# Patient Record
Sex: FEMALE | Race: WHITE | NOT HISPANIC OR LATINO | Employment: UNEMPLOYED | URBAN - METROPOLITAN AREA
[De-identification: names, ages, dates, MRNs, and addresses within clinical notes are randomized per-mention and may not be internally consistent; named-entity substitution may affect disease eponyms.]

---

## 2017-01-31 ENCOUNTER — OFFICE VISIT (OUTPATIENT)
Dept: CARDIOLOGY | Facility: MEDICAL CENTER | Age: 62
End: 2017-01-31
Payer: COMMERCIAL

## 2017-01-31 ENCOUNTER — NON-PROVIDER VISIT (OUTPATIENT)
Dept: CARDIOLOGY | Facility: MEDICAL CENTER | Age: 62
End: 2017-01-31
Payer: COMMERCIAL

## 2017-01-31 VITALS
WEIGHT: 168 LBS | HEART RATE: 88 BPM | BODY MASS INDEX: 27.99 KG/M2 | HEIGHT: 65 IN | SYSTOLIC BLOOD PRESSURE: 162 MMHG | DIASTOLIC BLOOD PRESSURE: 80 MMHG

## 2017-01-31 DIAGNOSIS — Z95.0 CARDIAC PACEMAKER IN SITU: ICD-10-CM

## 2017-01-31 DIAGNOSIS — I10 ESSENTIAL HYPERTENSION: ICD-10-CM

## 2017-01-31 DIAGNOSIS — I49.5 SICK SINUS SYNDROME (HCC): ICD-10-CM

## 2017-01-31 PROCEDURE — 93280 PM DEVICE PROGR EVAL DUAL: CPT | Performed by: INTERNAL MEDICINE

## 2017-01-31 PROCEDURE — 99214 OFFICE O/P EST MOD 30 MIN: CPT | Performed by: INTERNAL MEDICINE

## 2017-01-31 ASSESSMENT — ENCOUNTER SYMPTOMS
CLAUDICATION: 0
CHILLS: 0
HEADACHES: 0
NAUSEA: 0
PALPITATIONS: 0
BLURRED VISION: 0
DIZZINESS: 0
LOSS OF CONSCIOUSNESS: 0
SORE THROAT: 0
WEAKNESS: 0
FOCAL WEAKNESS: 0
ABDOMINAL PAIN: 0
PND: 0
COUGH: 0
FALLS: 0
BRUISES/BLEEDS EASILY: 0
FEVER: 0
SHORTNESS OF BREATH: 0

## 2017-01-31 NOTE — Clinical Note
[facility]   [address], [address]  [place] [address]  Phone: [phone]  Fax: [phone]              Brooklynn Jain  1955    Encounter Date: 1/31/2017    Jairo Castillo M.D.          PROGRESS NOTE:  Subjective:   Brooklynn Jain is a 61 y.o. female who presents today for both hypertension dyslipidemia in the setting of diabetes with pacemaker with paroxysmal atrial fibrillation    She's been doing well since our last visit she follows up regularly with her primary care on her labs    Past Medical History   Diagnosis Date   • Unspecified sleep apnea    • Signs and symptoms involving emotional state    • Conduction disorder, unspecified CARDIAC CONDUCTION SYSTEM DISEASE, STATUS POST DUAL CHAMBER PACEMAKER.   • Type II or unspecified type diabetes mellitus without mention of complication, not stated as uncontrolled    • Other chronic nonalcoholic liver disease    • Other and unspecified hyperlipidemia    • Unspecified essential hypertension    • Atrial fibrillation (CMS-HCC)    • Cardiac pacemaker in situ 5/30/2012 6/7/2006: Dual chamber Medtronic impulse DR model, #[id number], serial #[id number] implanted by Dr. Jaydon Wyatt.      Past Surgical History   Procedure Laterality Date   • Cholecystectomy     • Tonsillectomy and adenoidectomy     • Appendectomy       History reviewed. No pertinent family history.  History   Smoking status   • Never Smoker    Smokeless tobacco   • Never Used     No Known Allergies  Outpatient Encounter Prescriptions as of 1/31/2017   Medication Sig Dispense Refill   • labetalol (NORMODYNE) 100 MG TABS TAKE ONE TABLET ORALLY ONCE DAILY 90 Tab 2   • losartan-hydrochlorothiazide (HYZAAR) 100-12.5 MG per tablet Take 1 Tab by mouth every day.     • simvastatin (ZOCOR) 40 MG Tab Take 40 mg by mouth every evening.     • Multiple Vitamins-Minerals (MULTI FOR HER PO) Take  by mouth.     • B COMPLEX-C PO Take  by mouth.     • metformin  "(GLUCOPHAGE) 500 MG TABS Take 500 mg by mouth 2 times a day.     • alprazolam (XANAX) 0.25 MG TABS Take 1 Tab by mouth 3 times a day as needed for Sleep. f 90 Tab 0   • aspirin buffered (ASCRIPTIN) 325 MG TABS Take 1 Tab by mouth every day.       No facility-administered encounter medications on file as of 1/31/2017.     Review of Systems   Constitutional: Negative for fever and chills.   HENT: Negative for sore throat.    Eyes: Negative for blurred vision.   Respiratory: Negative for cough and shortness of breath.    Cardiovascular: Negative for chest pain, palpitations, claudication, leg swelling and PND.   Gastrointestinal: Negative for nausea and abdominal pain.   Musculoskeletal: Negative for falls.   Skin: Negative for rash.   Neurological: Negative for dizziness, focal weakness, loss of consciousness, weakness and headaches.   Endo/Heme/Allergies: Does not bruise/bleed easily.        Objective:   /80 mmHg  Pulse 88  Ht 1.651 m (5' 5\")  Wt 76.204 kg (168 lb)  BMI 27.96 kg/m2    Physical Exam   Constitutional: No distress.   HENT:   Mouth/Throat: Oropharynx is clear and moist.   Eyes: No scleral icterus.   Cardiovascular: Normal rate, regular rhythm and intact distal pulses.  Exam reveals no gallop and no friction rub.    No murmur heard.  Pulmonary/Chest: Effort normal and breath sounds normal. She has no rales.   Abdominal: Bowel sounds are normal. There is no tenderness.   Musculoskeletal: She exhibits no edema.   Neurological: She is alert.   Skin: No rash noted. She is not diaphoretic.   Psychiatric: She has a normal mood and affect.     We reviewed her labs which show well-controlled dyslipidemia A1c was 7    Her EKG from primary care show sinus rhythm with IVCD  Assessment:     1. Essential hypertension     2. Cardiac pacemaker in situ     3. Sick sinus syndrome (CMS-HCC)         Medical Decision Making:  Today's Assessment / Status / Plan:     It was my pleasure to meet with Ms. " Susy.    Doing well for her cardiac status she is on appropriate medications for blood pressure and lipids, blood pressure was a bit higher today she reports her home blood pressures are adequate.    I will see Ms. Jain back in 6 months time and encouraged her to follow up with us over the phone or e-mail using my MyChart as issues arise.    It is my pleasure to participate in the care of Ms. Jain.  Please do not hesitate to contact me with questions or concerns.    Jairo Castillo MD PhD FAC  Cardiologist [facility]        Robb Veliz M.D.  [address] [address]  VIA Facsimile: [phone]

## 2017-01-31 NOTE — MR AVS SNAPSHOT
"        Brooklynn Jain   2017 4:15 PM   Office Visit   MRN: [id number]    Department:  [facility]   Dept Phone:  [phone]    Description:  Female : 1955   Provider:  Jairo Castillo M.D.           Reason for Visit     Follow-Up           Allergies as of 2017     No Known Allergies      You were diagnosed with     Essential hypertension   [2227908]       Cardiac pacemaker in situ   [V45.01.ICD-9-CM]       Sick sinus syndrome (CMS-HCC)   [024486]         Vital Signs     Blood Pressure Pulse Height Weight Body Mass Index Smoking Status    162/80 mmHg 88 1.651 m (5' 5\") 76.204 kg (168 lb) 27.96 kg/m2 Never Smoker       Basic Information     Date Of Birth Sex Race Ethnicity Preferred Language    1955 Female White Non- English      Your appointments     2017  4:00 PM   PACER CHECK ONLY with PACER CHECK-[facility]   [facility] (--)    [address], [address]  [place] [id number] [phone]            2017  4:15 PM   FOLLOW UP with Jairo Castillo M.D.   [facility] (--)    [address], [address]  [place] [id number] [phone]              Problem List              ICD-10-CM Priority Class Noted - Resolved    Sleep apnea G47.30   Unknown - Present    Signs and symptoms involving emotional state R45.89   Unknown - Present    Cardiac conduction disorder I45.9   Unknown - Present    Type II or unspecified type diabetes mellitus without mention of complication, not stated as uncontrolled E11.9   Unknown - Present    Other chronic nonalcoholic liver disease K76.89   Unknown - Present    Other and unspecified hyperlipidemia E78.5   Unknown - Present    Essential hypertension I10   Unknown - Present    Paroxysmal atrial fibrillation (CMS-HCC) I48.0   Unknown - Present    Cardiac pacemaker in situ Z95.0   2012 - Present    Sick sinus syndrome (CMS-HCC) I49.5   2014 - Present  "      Health Maintenance        Date Due Completion Dates    IMM HEP B VACCINE (1 of 3 - Primary Series) 1955 ---    A1C SCREENING 1955 ---    DIABETES MONOFILAMENT / LE EXAM 1955 ---    RETINAL SCREENING 3/5/1975 ---    URINE ACR / MICROALBUMIN 3/5/1975 ---    IMM DTaP/Tdap/Td Vaccine (1 - Tdap) 3/5/1976 ---    IMM PNEUMOCOCCAL 19-64 (ADULT) MEDIUM RISK SERIES (1 of 1 - PPSV23) 3/5/1976 ---    PAP SMEAR 3/5/1978 ---    MAMMOGRAM 3/5/1997 ---    COLONOSCOPY 3/5/2007 ---    FASTING LIPID PROFILE 6/11/2013 6/11/2012    SERUM CREATININE 6/11/2013 6/11/2012    IMM INFLUENZA (1) 9/1/2016 ---            Current Immunizations     No immunizations on file.      Below and/or attached are the medications your provider expects you to take. Review all of your home medications and newly ordered medications with your provider and/or pharmacist. Follow medication instructions as directed by your provider and/or pharmacist. Please keep your medication list with you and share with your provider. Update the information when medications are discontinued, doses are changed, or new medications (including over-the-counter products) are added; and carry medication information at all times in the event of emergency situations     Allergies:  No Known Allergies          Medications  Valid as of: January 31, 2017 -  4:57 PM    Generic Name Brand Name Tablet Size Instructions for use    ALPRAZolam (Tab) XANAX 0.25 MG Take 1 Tab by mouth 3 times a day as needed for Sleep. f        Aspirin Buf(IhItu-AwAho-NyBrj) (Tab) ASCRIPTIN 325 MG Take 1 Tab by mouth every day.        B Complex-C   Take  by mouth.        Labetalol HCl (Tab) NORMODYNE 100 MG TAKE ONE TABLET ORALLY ONCE DAILY        Losartan Potassium-HCTZ (Tab) HYZAAR 100-12.5 MG Take 1 Tab by mouth every day.        MetFORMIN HCl (Tab) GLUCOPHAGE 500 MG Take 500 mg by mouth 2 times a day.        Multiple Vitamins-Minerals   Take  by mouth.        Simvastatin (Tab) ZOCOR 40 MG  Take 40 mg by mouth every evening.        .                 Medicines prescribed today were sent to:     [facility] [place], [place] - [address] [place] [address]    Phone: [phone] Fax: [phone]    Open 24 Hours?: No      Medication refill instructions:       If your prescription bottle indicates you have medication refills left, it is not necessary to call your provider’s office. Please contact your pharmacy and they will refill your medication.    If your prescription bottle indicates you do not have any refills left, you may request refills at any time through one of the following ways: The online Letsdecco system (except Urgent Care), by calling your provider’s office, or by asking your pharmacy to contact your provider’s office with a refill request. Medication refills are processed only during regular business hours and may not be available until the next business day. Your provider may request additional information or to have a follow-up visit with you prior to refilling your medication.   *Please Note: Medication refills are assigned a new Rx number when refilled electronically. Your pharmacy may indicate that no refills were authorized even though a new prescription for the same medication is available at the pharmacy. Please request the medicine by name with the pharmacy before contacting your provider for a refill.           Letsdecco Access Code: [email or web address]  Expires: 3/2/2017  4:57 PM    Letsdecco  A secure, online tool to manage your health information     [employer]’s Letsdecco® is a secure, online tool that connects you to your personalized health information from the privacy of your home -- day or night - making it very easy for you to manage your healthcare. Once the activation process is completed, you can even access your medical information using the Letsdecco abdias, which is available for free in the Apple Abdias store or Google Play  store.     THERAVECTYS provides the following levels of access (as shown below):   My Chart Features   [facility] Primary [facility] Doctor [facility]  Specialists [facility]  Urgent  Care Non-[facility]  Primary Care  Doctor   Email your healthcare team securely and privately 24/7 X X X    Manage appointments: schedule your next appointment; view details of past/upcoming appointments X      Request prescription refills. X      View recent personal medical records, including lab and immunizations X X X X   View health record, including health history, allergies, medications X X X X   Read reports about your outpatient visits, procedures, consult and ER notes X X X X   See your discharge summary, which is a recap of your hospital and/or ER visit that includes your diagnosis, lab results, and care plan. X X       How to register for THERAVECTYS:  1. Go to  [email or web address].  2. Click on the Sign Up Now box, which takes you to the New Member Sign Up page. You will need to provide the following information:  a. Enter your THERAVECTYS Access Code exactly as it appears at the top of this page. (You will not need to use this code after you’ve completed the sign-up process. If you do not sign up before the expiration date, you must request a new code.)   b. Enter your date of birth.   c. Enter your home email address.   d. Click Submit, and follow the next screen’s instructions.  3. Create a THERAVECTYS ID. This will be your THERAVECTYS login ID and cannot be changed, so think of one that is secure and easy to remember.  4. Create a THERAVECTYS password. You can change your password at any time.  5. Enter your Password Reset Question and Answer. This can be used at a later time if you forget your password.   6. Enter your e-mail address. This allows you to receive e-mail notifications when new information is available in THERAVECTYS.  7. Click Sign Up. You can now view your health information.    For assistance activating your THERAVECTYS account, call [phone]

## 2017-02-01 NOTE — PROGRESS NOTES
Subjective:   Brooklynn Jain is a 61 y.o. female who presents today for both hypertension dyslipidemia in the setting of diabetes with pacemaker with paroxysmal atrial fibrillation    She's been doing well since our last visit she follows up regularly with her primary care on her labs    Past Medical History   Diagnosis Date   • Unspecified sleep apnea    • Signs and symptoms involving emotional state    • Conduction disorder, unspecified CARDIAC CONDUCTION SYSTEM DISEASE, STATUS POST DUAL CHAMBER PACEMAKER.   • Type II or unspecified type diabetes mellitus without mention of complication, not stated as uncontrolled    • Other chronic nonalcoholic liver disease    • Other and unspecified hyperlipidemia    • Unspecified essential hypertension    • Atrial fibrillation (CMS-HCC)    • Cardiac pacemaker in situ 5/30/2012 6/7/2006: Dual chamber Medtronic impulse DR model, #[id number], serial #[id number] implanted by Dr. Jaydon Wyatt.      Past Surgical History   Procedure Laterality Date   • Cholecystectomy     • Tonsillectomy and adenoidectomy     • Appendectomy       History reviewed. No pertinent family history.  History   Smoking status   • Never Smoker    Smokeless tobacco   • Never Used     No Known Allergies  Outpatient Encounter Prescriptions as of 1/31/2017   Medication Sig Dispense Refill   • labetalol (NORMODYNE) 100 MG TABS TAKE ONE TABLET ORALLY ONCE DAILY 90 Tab 2   • losartan-hydrochlorothiazide (HYZAAR) 100-12.5 MG per tablet Take 1 Tab by mouth every day.     • simvastatin (ZOCOR) 40 MG Tab Take 40 mg by mouth every evening.     • Multiple Vitamins-Minerals (MULTI FOR HER PO) Take  by mouth.     • B COMPLEX-C PO Take  by mouth.     • metformin (GLUCOPHAGE) 500 MG TABS Take 500 mg by mouth 2 times a day.     • alprazolam (XANAX) 0.25 MG TABS Take 1 Tab by mouth 3 times a day as needed for Sleep. f 90 Tab 0   • aspirin buffered (ASCRIPTIN) 325 MG TABS Take 1 Tab by mouth every day.       No  "facility-administered encounter medications on file as of 1/31/2017.     Review of Systems   Constitutional: Negative for fever and chills.   HENT: Negative for sore throat.    Eyes: Negative for blurred vision.   Respiratory: Negative for cough and shortness of breath.    Cardiovascular: Negative for chest pain, palpitations, claudication, leg swelling and PND.   Gastrointestinal: Negative for nausea and abdominal pain.   Musculoskeletal: Negative for falls.   Skin: Negative for rash.   Neurological: Negative for dizziness, focal weakness, loss of consciousness, weakness and headaches.   Endo/Heme/Allergies: Does not bruise/bleed easily.        Objective:   /80 mmHg  Pulse 88  Ht 1.651 m (5' 5\")  Wt 76.204 kg (168 lb)  BMI 27.96 kg/m2    Physical Exam   Constitutional: No distress.   HENT:   Mouth/Throat: Oropharynx is clear and moist.   Eyes: No scleral icterus.   Cardiovascular: Normal rate, regular rhythm and intact distal pulses.  Exam reveals no gallop and no friction rub.    No murmur heard.  Pulmonary/Chest: Effort normal and breath sounds normal. She has no rales.   Abdominal: Bowel sounds are normal. There is no tenderness.   Musculoskeletal: She exhibits no edema.   Neurological: She is alert.   Skin: No rash noted. She is not diaphoretic.   Psychiatric: She has a normal mood and affect.     We reviewed her labs which show well-controlled dyslipidemia A1c was 7    Her EKG from primary care show sinus rhythm with IVCD  Assessment:     1. Essential hypertension     2. Cardiac pacemaker in situ     3. Sick sinus syndrome (CMS-HCC)         Medical Decision Making:  Today's Assessment / Status / Plan:     It was my pleasure to meet with Ms. Jain.    Doing well for her cardiac status she is on appropriate medications for blood pressure and lipids, blood pressure was a bit higher today she reports her home blood pressures are adequate.    I will see Ms. Jain back in 6 months time and encouraged her " to follow up with us over the phone or e-mail using my MyChart as issues arise.    It is my pleasure to participate in the care of Ms. Jain.  Please do not hesitate to contact me with questions or concerns.    Jairo Castillo MD PhD FACC  Cardiologist [facility]

## 2017-06-19 ENCOUNTER — NON-PROVIDER VISIT (OUTPATIENT)
Dept: CARDIOLOGY | Facility: MEDICAL CENTER | Age: 62
End: 2017-06-19
Payer: COMMERCIAL

## 2017-06-19 ENCOUNTER — OFFICE VISIT (OUTPATIENT)
Dept: CARDIOLOGY | Facility: MEDICAL CENTER | Age: 62
End: 2017-06-19
Payer: COMMERCIAL

## 2017-06-19 VITALS
HEIGHT: 65 IN | HEART RATE: 60 BPM | WEIGHT: 167 LBS | SYSTOLIC BLOOD PRESSURE: 120 MMHG | OXYGEN SATURATION: 97 % | DIASTOLIC BLOOD PRESSURE: 80 MMHG | BODY MASS INDEX: 27.82 KG/M2

## 2017-06-19 DIAGNOSIS — Z95.0 CARDIAC PACEMAKER IN SITU: ICD-10-CM

## 2017-06-19 DIAGNOSIS — I45.9 CARDIAC CONDUCTION DISORDER: ICD-10-CM

## 2017-06-19 DIAGNOSIS — I10 ESSENTIAL HYPERTENSION: ICD-10-CM

## 2017-06-19 DIAGNOSIS — I48.0 PAROXYSMAL ATRIAL FIBRILLATION (HCC): ICD-10-CM

## 2017-06-19 PROCEDURE — 99214 OFFICE O/P EST MOD 30 MIN: CPT | Mod: 25 | Performed by: INTERNAL MEDICINE

## 2017-06-19 PROCEDURE — 93280 PM DEVICE PROGR EVAL DUAL: CPT | Performed by: INTERNAL MEDICINE

## 2017-06-19 NOTE — Clinical Note
[facility]   [address]  [place] [address]  Phone: [phone]  Fax: [phone]              Brooklynn Jain  1955    Encounter Date: 6/19/2017    Jairo Castillo M.D.          PROGRESS NOTE:  Subjective:   Brooklynn Jain is a 62 y.o. female who presents today for follow-up of her history of pacemaker with paroxysmal atrial fibrillation    Her blood pressures were increasing and so her labetalol was increased by her primary care with good result    [place]    Past Medical History   Diagnosis Date   • Unspecified sleep apnea    • Signs and symptoms involving emotional state    • Conduction disorder, unspecified CARDIAC CONDUCTION SYSTEM DISEASE, STATUS POST DUAL CHAMBER PACEMAKER.   • Type II or unspecified type diabetes mellitus without mention of complication, not stated as uncontrolled    • Other chronic nonalcoholic liver disease    • Other and unspecified hyperlipidemia    • Unspecified essential hypertension    • Atrial fibrillation (CMS-HCC)    • Cardiac pacemaker in situ 5/30/2012 6/7/2006: Dual chamber Medtronic impulse DR model, #E2DR01, serial #[id number] implanted by Dr. Jaydon Wyatt.      Past Surgical History   Procedure Laterality Date   • Cholecystectomy     • Tonsillectomy and adenoidectomy     • Appendectomy       History reviewed. No pertinent family history.  History   Smoking status   • Never Smoker    Smokeless tobacco   • Never Used     No Known Allergies  Outpatient Encounter Prescriptions as of 6/19/2017   Medication Sig Dispense Refill   • losartan-hydrochlorothiazide (HYZAAR) 100-12.5 MG per tablet Take 1 Tab by mouth every day.     • simvastatin (ZOCOR) 40 MG Tab Take 40 mg by mouth every evening.     • Multiple Vitamins-Minerals (MULTI FOR HER PO) Take  by mouth.     • B COMPLEX-C PO Take  by mouth.     • labetalol (NORMODYNE) 100 MG TABS TAKE ONE TABLET  "ORALLY ONCE DAILY (Patient taking differently: TAKE ONE TABLET ORALLY 2x daily) 90 Tab 2   • metformin (GLUCOPHAGE) 500 MG TABS Take 500 mg by mouth 2 times a day.     • alprazolam (XANAX) 0.25 MG TABS Take 1 Tab by mouth 3 times a day as needed for Sleep. f 90 Tab 0   • aspirin buffered (ASCRIPTIN) 325 MG TABS Take 1 Tab by mouth every day.       No facility-administered encounter medications on file as of 6/19/2017.     Review of Systems   Constitutional: Negative for fever and chills.   HENT: Negative for sore throat.    Eyes: Negative for blurred vision.   Respiratory: Negative for cough and shortness of breath.    Cardiovascular: Negative for chest pain, palpitations, claudication, leg swelling and PND.   Gastrointestinal: Negative for nausea and abdominal pain.   Musculoskeletal: Negative for falls.   Skin: Negative for rash.   Neurological: Negative for dizziness, focal weakness, loss of consciousness, weakness and headaches.   Endo/Heme/Allergies: Does not bruise/bleed easily.        Objective:   /80 mmHg  Pulse 60  Ht 1.651 m (5' 5\")  Wt 75.751 kg (167 lb)  BMI 27.79 kg/m2  SpO2 97%    Physical Exam   Constitutional: No distress.   HENT:   Mouth/Throat: Oropharynx is clear and moist.   Eyes: No scleral icterus.   Cardiovascular: Normal rate, regular rhythm and intact distal pulses.  Exam reveals no gallop and no friction rub.    No murmur heard.  Pulmonary/Chest: Effort normal and breath sounds normal. She has no rales.   Abdominal: Bowel sounds are normal. There is no tenderness.   Musculoskeletal: She exhibits no edema.   Neurological: She is alert.   Skin: No rash noted. She is not diaphoretic.   Psychiatric: She has a normal mood and affect.       Assessment:     1. Cardiac pacemaker in situ     2. Paroxysmal atrial fibrillation (CMS-HCC)     3. Essential hypertension         Medical Decision Making:  Today's Assessment / Status / Plan:     It was my pleasure to meet with Ms. Jain.    Francescor " check today still reveals paroxysmal atrial fibrillation very short duration she understands based on her risk factors should this increase she may need long-term anticoagulation    Her blood pressure is now well controlled she'll continue to monitor this    I will see Ms. Jain back in 1 year time and encouraged her to follow up with us over the phone or e-mail using my MyChart as issues arise.    It is my pleasure to participate in the care of Ms. Jain.  Please do not hesitate to contact me with questions or concerns.    Jairo Castillo MD PhD [facility]  Cardiologist [facility]        Robb Veliz M.D.  [address] [address]  VIA Facsimile: [phone]

## 2017-06-19 NOTE — MR AVS SNAPSHOT
"        Brooklynn Jain   2017 1:15 PM   Office Visit   MRN: [id number]    Department:  [facility]   Dept Phone:  [phone]    Description:  Female : 1955   Provider:  Jairo Castillo M.D.           Reason for Visit     Follow-Up           Allergies as of 2017     No Known Allergies      You were diagnosed with     Cardiac pacemaker in situ   [V45.01.ICD-9-CM]       Paroxysmal atrial fibrillation (CMS-HCC)   [025303]       Essential hypertension   [0751737]         Vital Signs     Blood Pressure Pulse Height Weight Body Mass Index Oxygen Saturation    120/80 mmHg 60 1.651 m (5' 5\") 75.751 kg (167 lb) 27.79 kg/m2 97%    Smoking Status                   Never Smoker            Basic Information     Date Of Birth Sex Race Ethnicity Preferred Language    1955 Female White Non- English      Your appointments     2017  4:00 PM   PACER CHECK ONLY with PACER CHECK-[facility]   [facility] (--)    [address], [facility] [address]  [place] [address]   [phone]              Problem List              ICD-10-CM Priority Class Noted - Resolved    Sleep apnea G47.30   Unknown - Present    Signs and symptoms involving emotional state R45.89   Unknown - Present    Cardiac conduction disorder I45.9   Unknown - Present    Type II or unspecified type diabetes mellitus without mention of complication, not stated as uncontrolled E11.9   Unknown - Present    Other chronic nonalcoholic liver disease K76.89   Unknown - Present    Other and unspecified hyperlipidemia E78.5   Unknown - Present    Essential hypertension I10   Unknown - Present    Paroxysmal atrial fibrillation (CMS-HCC) I48.0   Unknown - Present    Cardiac pacemaker in situ Z95.0   2012 - Present    Sick sinus syndrome (CMS-HCC) I49.5   2014 - Present      Health Maintenance        Date Due Completion Dates    IMM HEP B VACCINE (1 of 3 - Primary Series) 1955 ---    A1C SCREENING " 1955 ---    DIABETES MONOFILAMENT / LE EXAM 1955 ---    RETINAL SCREENING 3/5/1975 ---    URINE ACR / MICROALBUMIN 3/5/1975 ---    IMM DTaP/Tdap/Td Vaccine (1 - Tdap) 3/5/1976 ---    IMM PNEUMOCOCCAL 19-64 (ADULT) MEDIUM RISK SERIES (1 of 1 - PPSV23) 3/5/1976 ---    PAP SMEAR 3/5/1978 ---    MAMMOGRAM 3/5/1997 ---    COLONOSCOPY 3/5/2007 ---    FASTING LIPID PROFILE 6/11/2013 6/11/2012    SERUM CREATININE 6/11/2013 6/11/2012    IMM ZOSTER VACCINE 3/5/2017 ---            Current Immunizations     No immunizations on file.      Below and/or attached are the medications your provider expects you to take. Review all of your home medications and newly ordered medications with your provider and/or pharmacist. Follow medication instructions as directed by your provider and/or pharmacist. Please keep your medication list with you and share with your provider. Update the information when medications are discontinued, doses are changed, or new medications (including over-the-counter products) are added; and carry medication information at all times in the event of emergency situations     Allergies:  No Known Allergies          Medications  Valid as of: June 19, 2017 -  1:45 PM    Generic Name Brand Name Tablet Size Instructions for use    ALPRAZolam (Tab) XANAX 0.25 MG Take 1 Tab by mouth 3 times a day as needed for Sleep. f        Aspirin Buf(SgSvy-QrJed-KcZbo) (Tab) ASCRIPTIN 325 MG Take 1 Tab by mouth every day.        B Complex-C   Take  by mouth.        Labetalol HCl (Tab) NORMODYNE 100 MG TAKE ONE TABLET ORALLY ONCE DAILY        Losartan Potassium-HCTZ (Tab) HYZAAR 100-12.5 MG Take 1 Tab by mouth every day.        MetFORMIN HCl (Tab) GLUCOPHAGE 500 MG Take 500 mg by mouth 2 times a day.        Multiple Vitamins-Minerals   Take  by mouth.        Simvastatin (Tab) ZOCOR 40 MG Take 40 mg by mouth every evening.        .                 Medicines prescribed today were sent to:     [id number] - [id number]  [facility] [address] [address]    Phone: [phone] Fax: [phone]    Open 24 Hours?: No      Medication refill instructions:       If your prescription bottle indicates you have medication refills left, it is not necessary to call your provider’s office. Please contact your pharmacy and they will refill your medication.    If your prescription bottle indicates you do not have any refills left, you may request refills at any time through one of the following ways: The online [employer] system (except Urgent Care), by calling your provider’s office, or by asking your pharmacy to contact your provider’s office with a refill request. Medication refills are processed only during regular business hours and may not be available until the next business day. Your provider may request additional information or to have a follow-up visit with you prior to refilling your medication.   *Please Note: Medication refills are assigned a new Rx number when refilled electronically. Your pharmacy may indicate that no refills were authorized even though a new prescription for the same medication is available at the pharmacy. Please request the medicine by name with the pharmacy before contacting your provider for a refill.           [employer] Access Code: [email or web address]  Expires: 7/19/2017 12:50 PM    [employer]  A secure, online tool to manage your health information     [employer]’s [employer]® is a secure, online tool that connects you to your personalized health information from the privacy of your home -- day or night - making it very easy for you to manage your healthcare. Once the activation process is completed, you can even access your medical information using the [employer] abdias, which is available for free in the Apple Abdias store or Google Play store.     [employer] provides the following levels of access (as shown below):   My Chart Features   [facility] Primary Care Doctor  [facility]  Specialists [facility]  Urgent  Care Non-[facility]  Primary Care  Doctor   Email your healthcare team securely and privately 24/7 X X X    Manage appointments: schedule your next appointment; view details of past/upcoming appointments X      Request prescription refills. X      View recent personal medical records, including lab and immunizations X X X X   View health record, including health history, allergies, medications X X X X   Read reports about your outpatient visits, procedures, consult and ER notes X X X X   See your discharge summary, which is a recap of your hospital and/or ER visit that includes your diagnosis, lab results, and care plan. X X       How to register for [employer]:  1. Go to  [email or web address].  2. Click on the Sign Up Now box, which takes you to the New Member Sign Up page. You will need to provide the following information:  a. Enter your [employer] Access Code exactly as it appears at the top of this page. (You will not need to use this code after you’ve completed the sign-up process. If you do not sign up before the expiration date, you must request a new code.)   b. Enter your date of birth.   c. Enter your home email address.   d. Click Submit, and follow the next screen’s instructions.  3. Create a [employer] ID. This will be your [employer] login ID and cannot be changed, so think of one that is secure and easy to remember.  4. Create a [employer] password. You can change your password at any time.  5. Enter your Password Reset Question and Answer. This can be used at a later time if you forget your password.   6. Enter your e-mail address. This allows you to receive e-mail notifications when new information is available in [employer].  7. Click Sign Up. You can now view your health information.    For assistance activating your [employer] account, call [phone]

## 2017-06-20 ASSESSMENT — ENCOUNTER SYMPTOMS
SORE THROAT: 0
CHILLS: 0
NAUSEA: 0
DIZZINESS: 0
PND: 0
PALPITATIONS: 0
FEVER: 0
LOSS OF CONSCIOUSNESS: 0
FALLS: 0
BLURRED VISION: 0
WEAKNESS: 0
ABDOMINAL PAIN: 0
FOCAL WEAKNESS: 0
HEADACHES: 0
COUGH: 0
CLAUDICATION: 0
SHORTNESS OF BREATH: 0
BRUISES/BLEEDS EASILY: 0

## 2017-06-21 NOTE — PROGRESS NOTES
Subjective:   Brooklynn Jain is a 62 y.o. female who presents today for follow-up of her history of pacemaker with paroxysmal atrial fibrillation    Her blood pressures were increasing and so her labetalol was increased by her primary care with good result    She's been quite active [place] [employer] [place]    Past Medical History   Diagnosis Date   • Unspecified sleep apnea    • Signs and symptoms involving emotional state    • Conduction disorder, unspecified CARDIAC CONDUCTION SYSTEM DISEASE, STATUS POST DUAL CHAMBER PACEMAKER.   • Type II or unspecified type diabetes mellitus without mention of complication, not stated as uncontrolled    • Other chronic nonalcoholic liver disease    • Other and unspecified hyperlipidemia    • Unspecified essential hypertension    • Atrial fibrillation (CMS-HCC)    • Cardiac pacemaker in situ 5/30/2012 6/7/2006: Dual chamber Medtronic impulse DR model, #E2DR01, serial #[id number] implanted by Dr. Jaydon Wyatt.      Past Surgical History   Procedure Laterality Date   • Cholecystectomy     • Tonsillectomy and adenoidectomy     • Appendectomy       History reviewed. No pertinent family history.  History   Smoking status   • Never Smoker    Smokeless tobacco   • Never Used     No Known Allergies  Outpatient Encounter Prescriptions as of 6/19/2017   Medication Sig Dispense Refill   • losartan-hydrochlorothiazide (HYZAAR) 100-12.5 MG per tablet Take 1 Tab by mouth every day.     • simvastatin (ZOCOR) 40 MG Tab Take 40 mg by mouth every evening.     • Multiple Vitamins-Minerals (MULTI FOR HER PO) Take  by mouth.     • B COMPLEX-C PO Take  by mouth.     • labetalol (NORMODYNE) 100 MG TABS TAKE ONE TABLET ORALLY ONCE DAILY (Patient taking differently: TAKE ONE TABLET ORALLY 2x daily) 90 Tab 2   • metformin (GLUCOPHAGE) 500 MG TABS Take 500 mg by mouth 2 times a day.     • alprazolam (XANAX) 0.25 MG TABS Take 1 Tab by mouth 3 times a day as needed for Sleep. f 90 Tab 0  "  • aspirin buffered (ASCRIPTIN) 325 MG TABS Take 1 Tab by mouth every day.       No facility-administered encounter medications on file as of 6/19/2017.     Review of Systems   Constitutional: Negative for fever and chills.   HENT: Negative for sore throat.    Eyes: Negative for blurred vision.   Respiratory: Negative for cough and shortness of breath.    Cardiovascular: Negative for chest pain, palpitations, claudication, leg swelling and PND.   Gastrointestinal: Negative for nausea and abdominal pain.   Musculoskeletal: Negative for falls.   Skin: Negative for rash.   Neurological: Negative for dizziness, focal weakness, loss of consciousness, weakness and headaches.   Endo/Heme/Allergies: Does not bruise/bleed easily.        Objective:   /80 mmHg  Pulse 60  Ht 1.651 m (5' 5\")  Wt 75.751 kg (167 lb)  BMI 27.79 kg/m2  SpO2 97%    Physical Exam   Constitutional: No distress.   HENT:   Mouth/Throat: Oropharynx is clear and moist.   Eyes: No scleral icterus.   Cardiovascular: Normal rate, regular rhythm and intact distal pulses.  Exam reveals no gallop and no friction rub.    No murmur heard.  Pulmonary/Chest: Effort normal and breath sounds normal. She has no rales.   Abdominal: Bowel sounds are normal. There is no tenderness.   Musculoskeletal: She exhibits no edema.   Neurological: She is alert.   Skin: No rash noted. She is not diaphoretic.   Psychiatric: She has a normal mood and affect.       Assessment:     1. Cardiac pacemaker in situ     2. Paroxysmal atrial fibrillation (CMS-HCC)     3. Essential hypertension         Medical Decision Making:  Today's Assessment / Status / Plan:     It was my pleasure to meet with Ms. Jain.    Pacer check today still reveals paroxysmal atrial fibrillation very short duration she understands based on her risk factors should this increase she may need long-term anticoagulation    Her blood pressure is now well controlled she'll continue to monitor this    I will " see Ms. Jain back in 1 year time and encouraged her to follow up with us over the phone or e-mail using my MyChart as issues arise.    It is my pleasure to participate in the care of Ms. Jain.  Please do not hesitate to contact me with questions or concerns.    Jairo Castillo MD PhD FAC  Cardiologist [facility]

## 2017-12-20 ENCOUNTER — NON-PROVIDER VISIT (OUTPATIENT)
Dept: CARDIOLOGY | Facility: MEDICAL CENTER | Age: 62
End: 2017-12-20
Payer: COMMERCIAL

## 2017-12-20 ENCOUNTER — OFFICE VISIT (OUTPATIENT)
Dept: CARDIOLOGY | Facility: MEDICAL CENTER | Age: 62
End: 2017-12-20
Payer: COMMERCIAL

## 2017-12-20 VITALS
OXYGEN SATURATION: 95 % | HEIGHT: 65 IN | BODY MASS INDEX: 28.16 KG/M2 | HEART RATE: 74 BPM | DIASTOLIC BLOOD PRESSURE: 80 MMHG | WEIGHT: 169 LBS | SYSTOLIC BLOOD PRESSURE: 140 MMHG

## 2017-12-20 DIAGNOSIS — I48.0 PAROXYSMAL ATRIAL FIBRILLATION (HCC): ICD-10-CM

## 2017-12-20 DIAGNOSIS — Z95.0 CARDIAC PACEMAKER IN SITU: ICD-10-CM

## 2017-12-20 DIAGNOSIS — I10 ESSENTIAL HYPERTENSION: ICD-10-CM

## 2017-12-20 DIAGNOSIS — I49.5 SICK SINUS SYNDROME (HCC): ICD-10-CM

## 2017-12-20 PROCEDURE — 99214 OFFICE O/P EST MOD 30 MIN: CPT | Performed by: INTERNAL MEDICINE

## 2017-12-20 PROCEDURE — 93280 PM DEVICE PROGR EVAL DUAL: CPT | Performed by: INTERNAL MEDICINE

## 2017-12-20 NOTE — LETTER
[facility]   [address] [facility]  [place] [address]  Phone: [phone]  Fax: [phone]              Brooklynn Jain  1955    Encounter Date: 12/20/2017    Jairo Castillo M.D.          PROGRESS NOTE:  Subjective:   Brooklynn Jain is a 62 y.o. female who presents today For follow-up of her history of hypertension paroxysmal atrial fibrillation very brief on pacer checks with a pacemaker     She follows very regular with her primary care she had an JACQUELYN with abnormally high readings suggesting calcified arteries are blood pressure was also elevated on most checks    She's been doing well she does have some stress related to her work she reports left shoulder arthritis    Past Medical History:   Diagnosis Date   • Atrial fibrillation (CMS-HCC)    • Cardiac pacemaker in situ 5/30/2012 6/7/2006: Dual chamber Medtronic impulse DR model, #E2DR01, serial #[id number] implanted by Dr. Jaydon Wyatt.    • Conduction disorder, unspecified CARDIAC CONDUCTION SYSTEM DISEASE, STATUS POST DUAL CHAMBER PACEMAKER.   • Other and unspecified hyperlipidemia    • Other chronic nonalcoholic liver disease    • Signs and symptoms involving emotional state    • Type II or unspecified type diabetes mellitus without mention of complication, not stated as uncontrolled    • Unspecified essential hypertension    • Unspecified sleep apnea      Past Surgical History:   Procedure Laterality Date   • APPENDECTOMY     • CHOLECYSTECTOMY     • TONSILLECTOMY AND ADENOIDECTOMY       No family history on file.  History   Smoking Status   • Never Smoker   Smokeless Tobacco   • Never Used     No Known Allergies  Outpatient Encounter Prescriptions as of 12/20/2017   Medication Sig Dispense Refill   • losartan-hydrochlorothiazide (HYZAAR) 100-12.5 MG per tablet Take 1 Tab by mouth every day.     • simvastatin (ZOCOR) 40 MG Tab Take 40 mg by mouth every evening.     • Multiple Vitamins-Minerals  "(MULTI FOR HER PO) Take  by mouth.     • B COMPLEX-C PO Take  by mouth.     • labetalol (NORMODYNE) 100 MG TABS TAKE ONE TABLET ORALLY ONCE DAILY (Patient taking differently: TAKE ONE TABLET ORALLY 2x daily) 90 Tab 2   • metformin (GLUCOPHAGE) 500 MG TABS Take 500 mg by mouth 2 times a day.     • alprazolam (XANAX) 0.25 MG TABS Take 1 Tab by mouth 3 times a day as needed for Sleep. f 90 Tab 0   • aspirin buffered (ASCRIPTIN) 325 MG TABS Take 1 Tab by mouth every day.       No facility-administered encounter medications on file as of 12/20/2017.      Review of Systems   Constitutional: Negative for chills and fever.   HENT: Negative for sore throat.    Eyes: Negative for blurred vision.   Respiratory: Negative for cough and shortness of breath.    Cardiovascular: Negative for chest pain, palpitations, claudication, leg swelling and PND.   Gastrointestinal: Negative for abdominal pain and nausea.   Musculoskeletal: Positive for joint pain. Negative for falls.   Skin: Negative for rash.   Neurological: Negative for dizziness, focal weakness and weakness.   Endo/Heme/Allergies: Does not bruise/bleed easily.        Objective:   /80   Pulse 74   Ht 1.651 m (5' 5\")   Wt 76.7 kg (169 lb)   SpO2 95%   BMI 28.12 kg/m²      Physical Exam   Constitutional: No distress.   HENT:   Mouth/Throat: Oropharynx is clear and moist.   Eyes: No scleral icterus.   Cardiovascular: Normal rate, regular rhythm and intact distal pulses.  Exam reveals no gallop and no friction rub.    No murmur heard.  Pulmonary/Chest: Effort normal and breath sounds normal. She has no rales.   Abdominal: Bowel sounds are normal. There is no tenderness.   Musculoskeletal: She exhibits no edema.   Neurological: She is alert.   Skin: No rash noted. She is not diaphoretic.   Psychiatric: She has a normal mood and affect.     Pacer check today finds his functioning appropriately very brief atrial fibrillation still    Assessment:     1. Essential " hypertension     2. Paroxysmal atrial fibrillation (CMS-HCC)     3. Cardiac pacemaker in situ         Medical Decision Making:  Today's Assessment / Status / Plan:     It was my pleasure to meet with Ms. Jain.    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 Jain back in 6 months time and encouraged her to follow up with us over the phone or e-mail using my MyChart as issues arise.    It is my pleasure to participate in the care of Ms. Jain.  Please do not hesitate to contact me with questions or concerns.    Jairo Castillo MD PhD [facility]  Cardiologist [facility]        Robb Veliz M.D.  [address] [address]  VIA Facsimile: [phone]

## 2017-12-21 ASSESSMENT — ENCOUNTER SYMPTOMS
DIZZINESS: 0
BRUISES/BLEEDS EASILY: 0
CHILLS: 0
COUGH: 0
BLURRED VISION: 0
WEAKNESS: 0
SORE THROAT: 0
ABDOMINAL PAIN: 0
CLAUDICATION: 0
FEVER: 0
PALPITATIONS: 0
NAUSEA: 0
FOCAL WEAKNESS: 0
FALLS: 0
PND: 0
SHORTNESS OF BREATH: 0

## 2017-12-22 NOTE — PROGRESS NOTES
Subjective:   Brooklynn Jain is a 62 y.o. female who presents today For follow-up of her history of hypertension paroxysmal atrial fibrillation very brief on pacer checks with a pacemaker     She follows very regular with her primary care she had an JACQUELYN with abnormally high readings suggesting calcified arteries are blood pressure was also elevated on most checks    She's been doing well she does have some stress related to her work she reports left shoulder arthritis    Past Medical History:   Diagnosis Date   • Atrial fibrillation (CMS-HCC)    • Cardiac pacemaker in situ 5/30/2012 6/7/2006: Dual chamber Medtronic impulse DR model, #E2DR01, serial #[id number] implanted by Dr. Jaydon Wyatt.    • Conduction disorder, unspecified CARDIAC CONDUCTION SYSTEM DISEASE, STATUS POST DUAL CHAMBER PACEMAKER.   • Other and unspecified hyperlipidemia    • Other chronic nonalcoholic liver disease    • Signs and symptoms involving emotional state    • Type II or unspecified type diabetes mellitus without mention of complication, not stated as uncontrolled    • Unspecified essential hypertension    • Unspecified sleep apnea      Past Surgical History:   Procedure Laterality Date   • APPENDECTOMY     • CHOLECYSTECTOMY     • TONSILLECTOMY AND ADENOIDECTOMY       No family history on file.  History   Smoking Status   • Never Smoker   Smokeless Tobacco   • Never Used     No Known Allergies  Outpatient Encounter Prescriptions as of 12/20/2017   Medication Sig Dispense Refill   • losartan-hydrochlorothiazide (HYZAAR) 100-12.5 MG per tablet Take 1 Tab by mouth every day.     • simvastatin (ZOCOR) 40 MG Tab Take 40 mg by mouth every evening.     • Multiple Vitamins-Minerals (MULTI FOR HER PO) Take  by mouth.     • B COMPLEX-C PO Take  by mouth.     • labetalol (NORMODYNE) 100 MG TABS TAKE ONE TABLET ORALLY ONCE DAILY (Patient taking differently: TAKE ONE TABLET ORALLY 2x daily) 90 Tab 2   • metformin (GLUCOPHAGE) 500 MG TABS Take 500 mg  "by mouth 2 times a day.     • alprazolam (XANAX) 0.25 MG TABS Take 1 Tab by mouth 3 times a day as needed for Sleep. f 90 Tab 0   • aspirin buffered (ASCRIPTIN) 325 MG TABS Take 1 Tab by mouth every day.       No facility-administered encounter medications on file as of 12/20/2017.      Review of Systems   Constitutional: Negative for chills and fever.   HENT: Negative for sore throat.    Eyes: Negative for blurred vision.   Respiratory: Negative for cough and shortness of breath.    Cardiovascular: Negative for chest pain, palpitations, claudication, leg swelling and PND.   Gastrointestinal: Negative for abdominal pain and nausea.   Musculoskeletal: Positive for joint pain. Negative for falls.   Skin: Negative for rash.   Neurological: Negative for dizziness, focal weakness and weakness.   Endo/Heme/Allergies: Does not bruise/bleed easily.        Objective:   /80   Pulse 74   Ht 1.651 m (5' 5\")   Wt 76.7 kg (169 lb)   SpO2 95%   BMI 28.12 kg/m²     Physical Exam   Constitutional: No distress.   HENT:   Mouth/Throat: Oropharynx is clear and moist.   Eyes: No scleral icterus.   Cardiovascular: Normal rate, regular rhythm and intact distal pulses.  Exam reveals no gallop and no friction rub.    No murmur heard.  Pulmonary/Chest: Effort normal and breath sounds normal. She has no rales.   Abdominal: Bowel sounds are normal. There is no tenderness.   Musculoskeletal: She exhibits no edema.   Neurological: She is alert.   Skin: No rash noted. She is not diaphoretic.   Psychiatric: She has a normal mood and affect.     Pacer check today finds his functioning appropriately very brief atrial fibrillation still    Assessment:     1. Essential hypertension     2. Paroxysmal atrial fibrillation (CMS-HCC)     3. Cardiac pacemaker in situ         Medical Decision Making:  Today's Assessment / Status / Plan:     It was my pleasure to meet with Ms. Jain.    For now can continue to monitor her burden of atrial " fibrillation on the pacer check she understands with her risk factors that would recommend chronic anticoagulation should she have any significant increased such as episodes lasting longer than an hour    She'll continue monitor blood pressure closely    She is unsure if she may retire from her work at [employer] due to the associated stresses and schedule    Lipids are well controlled and has regular close follow-up with primary care    I will see Ms. Jain back in 6 months time and encouraged her to follow up with us over the phone or e-mail using my MyChart as issues arise.    It is my pleasure to participate in the care of Ms. Jain.  Please do not hesitate to contact me with questions or concerns.    Jairo Castillo MD PhD FACC  Cardiologist [facility]

## 2018-06-08 ENCOUNTER — NON-PROVIDER VISIT (OUTPATIENT)
Dept: CARDIOLOGY | Facility: MEDICAL CENTER | Age: 63
End: 2018-06-08
Payer: COMMERCIAL

## 2018-06-08 ENCOUNTER — OFFICE VISIT (OUTPATIENT)
Dept: CARDIOLOGY | Facility: MEDICAL CENTER | Age: 63
End: 2018-06-08
Payer: COMMERCIAL

## 2018-06-08 VITALS
SYSTOLIC BLOOD PRESSURE: 150 MMHG | WEIGHT: 168 LBS | DIASTOLIC BLOOD PRESSURE: 80 MMHG | BODY MASS INDEX: 27.99 KG/M2 | HEART RATE: 88 BPM | HEIGHT: 65 IN | OXYGEN SATURATION: 94 %

## 2018-06-08 DIAGNOSIS — Z95.0 CARDIAC PACEMAKER IN SITU: ICD-10-CM

## 2018-06-08 DIAGNOSIS — I49.5 SICK SINUS SYNDROME (HCC): ICD-10-CM

## 2018-06-08 DIAGNOSIS — I48.0 PAROXYSMAL ATRIAL FIBRILLATION (HCC): ICD-10-CM

## 2018-06-08 PROCEDURE — 93280 PM DEVICE PROGR EVAL DUAL: CPT | Performed by: INTERNAL MEDICINE

## 2018-06-08 PROCEDURE — 99214 OFFICE O/P EST MOD 30 MIN: CPT | Performed by: INTERNAL MEDICINE

## 2018-06-08 ASSESSMENT — ENCOUNTER SYMPTOMS
FOCAL WEAKNESS: 0
WEAKNESS: 0
CLAUDICATION: 0
DIZZINESS: 0
CHILLS: 0
NAUSEA: 0
BRUISES/BLEEDS EASILY: 0
SORE THROAT: 0
COUGH: 0
ABDOMINAL PAIN: 0
FALLS: 0
FEVER: 0
PALPITATIONS: 0
SHORTNESS OF BREATH: 0
BLURRED VISION: 0
PND: 0

## 2018-06-09 NOTE — PROGRESS NOTES
Chief Complaint   Patient presents with   • HTN (Controlled)     follow up       Subjective:   Brooklynn Jain is a 63 y.o. female who presents today for follow-up of her history of pacemaker with paroxysmal atrial fibrillation on pacer checks    She is doing well no major issues in the past 6 months still has A. fib less than an hour on pacer checks    Past Medical History:   Diagnosis Date   • Atrial fibrillation (HCC)    • Cardiac pacemaker in situ 5/30/2012 6/7/2006: Dual chamber Medtronic impulse DR model, #E2DR01, serial #[id number] implanted by Dr. Jaydon Wyatt.    • Conduction disorder, unspecified CARDIAC CONDUCTION SYSTEM DISEASE, STATUS POST DUAL CHAMBER PACEMAKER.   • Other and unspecified hyperlipidemia    • Other chronic nonalcoholic liver disease    • Signs and symptoms involving emotional state    • Type II or unspecified type diabetes mellitus without mention of complication, not stated as uncontrolled    • Unspecified essential hypertension    • Unspecified sleep apnea      Past Surgical History:   Procedure Laterality Date   • APPENDECTOMY     • CHOLECYSTECTOMY     • TONSILLECTOMY AND ADENOIDECTOMY       No family history on file.  Social History     Social History   • Marital status:      Spouse name: N/A   • Number of children: N/A   • Years of education: N/A     Occupational History   • Not on file.     Social History Main Topics   • Smoking status: Never Smoker   • Smokeless tobacco: Never Used   • Alcohol use No   • Drug use: Unknown   • Sexual activity: Yes     Other Topics Concern   • Not on file     Social History Narrative   • No narrative on file     No Known Allergies  Outpatient Encounter Prescriptions as of 6/8/2018   Medication Sig Dispense Refill   • losartan-hydrochlorothiazide (HYZAAR) 100-12.5 MG per tablet Take 1 Tab by mouth every day.     • simvastatin (ZOCOR) 40 MG Tab Take 40 mg by mouth every evening.     • Multiple Vitamins-Minerals (MULTI FOR HER PO) Take  by  "mouth.     • B COMPLEX-C PO Take  by mouth.     • labetalol (NORMODYNE) 100 MG TABS TAKE ONE TABLET ORALLY ONCE DAILY (Patient taking differently: TAKE ONE TABLET ORALLY 2x daily) 90 Tab 2   • metformin (GLUCOPHAGE) 500 MG TABS Take 500 mg by mouth 2 times a day.     • alprazolam (XANAX) 0.25 MG TABS Take 1 Tab by mouth 3 times a day as needed for Sleep. f 90 Tab 0   • aspirin buffered (ASCRIPTIN) 325 MG TABS Take 1 Tab by mouth every day.       No facility-administered encounter medications on file as of 6/8/2018.      Review of Systems   Constitutional: Negative for chills and fever.   HENT: Negative for sore throat.    Eyes: Negative for blurred vision.   Respiratory: Negative for cough and shortness of breath.    Cardiovascular: Negative for chest pain, palpitations, claudication, leg swelling and PND.   Gastrointestinal: Negative for abdominal pain and nausea.   Musculoskeletal: Negative for falls and joint pain.   Skin: Negative for rash.   Neurological: Negative for dizziness, focal weakness and weakness.   Endo/Heme/Allergies: Does not bruise/bleed easily.        Objective:   /80   Pulse 88   Ht 1.651 m (5' 5\")   Wt 76.2 kg (168 lb)   SpO2 94%   BMI 27.96 kg/m²     Physical Exam   Constitutional: No distress.   HENT:   Mouth/Throat: Oropharynx is clear and moist. No oropharyngeal exudate.   Eyes: No scleral icterus.   Neck: No JVD present.   Cardiovascular: Normal rate and normal heart sounds.  Exam reveals no gallop and no friction rub.    No murmur heard.  Pulmonary/Chest: No respiratory distress. She has no wheezes. She has no rales.   Abdominal: Soft. Bowel sounds are normal.   Musculoskeletal: She exhibits no edema.   Neurological: She is alert.   Skin: No rash noted. She is not diaphoretic.   Psychiatric: She has a normal mood and affect.       Assessment:     1. Paroxysmal atrial fibrillation (HCC)     2. Cardiac pacemaker in situ         Medical Decision Making:  Today's Assessment / " Status / Plan:     It was my pleasure to meet with Ms. Jain.    She follows closely with primary care  Again she consider long-term anticoagulation  Which in any case would be recommended certainly at age 67    She will discuss with primary care as well    I will see Ms. Jain back in 6-12 months time and encouraged her to follow up with us over the phone or e-mail using my MyChart as issues arise.    It is my pleasure to participate in the care of Ms. Jain.  Please do not hesitate to contact me with questions or concerns.    Jairo Castillo MD PhD FACC  Cardiologist [facility]

## 2019-03-08 ENCOUNTER — OFFICE VISIT (OUTPATIENT)
Dept: CARDIOLOGY | Facility: MEDICAL CENTER | Age: 64
End: 2019-03-08
Payer: COMMERCIAL

## 2019-03-08 ENCOUNTER — NON-PROVIDER VISIT (OUTPATIENT)
Dept: CARDIOLOGY | Facility: MEDICAL CENTER | Age: 64
End: 2019-03-08
Payer: COMMERCIAL

## 2019-03-08 VITALS
HEART RATE: 64 BPM | OXYGEN SATURATION: 97 % | WEIGHT: 168 LBS | BODY MASS INDEX: 27.99 KG/M2 | SYSTOLIC BLOOD PRESSURE: 126 MMHG | DIASTOLIC BLOOD PRESSURE: 68 MMHG | HEIGHT: 65 IN

## 2019-03-08 DIAGNOSIS — I49.5 SICK SINUS SYNDROME (HCC): ICD-10-CM

## 2019-03-08 DIAGNOSIS — Z95.0 CARDIAC PACEMAKER IN SITU: ICD-10-CM

## 2019-03-08 DIAGNOSIS — I48.0 PAROXYSMAL ATRIAL FIBRILLATION (HCC): ICD-10-CM

## 2019-03-08 PROCEDURE — 99214 OFFICE O/P EST MOD 30 MIN: CPT | Performed by: INTERNAL MEDICINE

## 2019-03-08 PROCEDURE — 93280 PM DEVICE PROGR EVAL DUAL: CPT | Performed by: INTERNAL MEDICINE

## 2019-03-08 RX ORDER — IRBESARTAN AND HYDROCHLOROTHIAZIDE 300; 12.5 MG/1; MG/1
1 TABLET, FILM COATED ORAL DAILY
COMMUNITY
Start: 2019-02-25

## 2019-03-08 ASSESSMENT — ENCOUNTER SYMPTOMS
COUGH: 0
ABDOMINAL PAIN: 0
PND: 0
SHORTNESS OF BREATH: 0
FOCAL WEAKNESS: 0
CHILLS: 0
NAUSEA: 0
BRUISES/BLEEDS EASILY: 0
FEVER: 0
FALLS: 0
CLAUDICATION: 0
PALPITATIONS: 0
BLURRED VISION: 0
SORE THROAT: 0
DIZZINESS: 0
WEAKNESS: 0

## 2019-03-08 NOTE — PATIENT INSTRUCTIONS
We discussed that you should talk with primary care (or endocrinology) about oral medication: empagliflozin (JARDIANCE®  Preferred), dapagliflozin (FARXIGA®), or Canagliflozin (Invokana® [CONCERN FOR FOOT AMPUTATION]); there are cardiovascular benefits but there are also risks.  You may also want to consider liraglutide (Victoza®) which is an injection with cardiovascular benefits but also risks.

## 2019-03-08 NOTE — LETTER
[facility]   [address], [address]  [place] [address]  Phone: [phone]  Fax: [phone]              Brooklynn Jain  1955    Encounter Date: 3/8/2019    Jairo Castillo M.D.          PROGRESS NOTE:  Chief Complaint   Patient presents with   • Atrial Fibrillation       Subjective:   Brooklynn Jain is a 64 y.o. female who presents today for follow-up of her history of paroxysmal A. fib ablation with a pacemaker which was actually placed for sick sinus syndrome.  She also has diabetes hypertension    She is been doing well she has a lot of stress with the significant snowfall this winter at [facility] with some orthopedic pains    Pacer check still shows paroxysms of atrial fibrillation lasting up to 2 hours which is a little bit longer than previously 45 minutes most of short-lived seem to occur throughout the day    Her arb was switched because of potential recall    Past Medical History:   Diagnosis Date   • Atrial fibrillation (HCC)    • Cardiac pacemaker in situ 5/30/2012 6/7/2006: Dual chamber Medtronic impulse DR model, #E2DR01, serial #[id number] implanted by Dr. Jaydon Wyatt.    • Conduction disorder, unspecified CARDIAC CONDUCTION SYSTEM DISEASE, STATUS POST DUAL CHAMBER PACEMAKER.   • Other and unspecified hyperlipidemia    • Other chronic nonalcoholic liver disease    • Signs and symptoms involving emotional state    • Type II or unspecified type diabetes mellitus without mention of complication, not stated as uncontrolled    • Unspecified essential hypertension    • Unspecified sleep apnea      Past Surgical History:   Procedure Laterality Date   • APPENDECTOMY     • CHOLECYSTECTOMY     • TONSILLECTOMY AND ADENOIDECTOMY       Family History   Problem Relation Age of Onset   • Hypertension Father      Social History     Social History   • Marital status:      Spouse name: N/A   • Number of children: N/A   • Years of education: N/A          Occupational History   • Not on file.     Social History Main Topics   • Smoking status: Never Smoker   • Smokeless tobacco: Never Used   • Alcohol use No   • Drug use: No   • Sexual activity: Yes     Other Topics Concern   • Not on file     Social History Narrative   • No narrative on file     No Known Allergies  Outpatient Encounter Prescriptions as of 3/8/2019   Medication Sig Dispense Refill   • losartan-hydrochlorothiazide (HYZAAR) 100-12.5 MG per tablet Take 1 Tab by mouth every day.     • simvastatin (ZOCOR) 40 MG Tab Take 40 mg by mouth every evening.     • Multiple Vitamins-Minerals (MULTI FOR HER PO) Take  by mouth.     • B COMPLEX-C PO Take  by mouth.     • labetalol (NORMODYNE) 100 MG TABS TAKE ONE TABLET ORALLY ONCE DAILY (Patient taking differently: TAKE ONE TABLET ORALLY 2x daily) 90 Tab 2   • metformin (GLUCOPHAGE) 500 MG TABS Take 500 mg by mouth 2 times a day.     • alprazolam (XANAX) 0.25 MG TABS Take 1 Tab by mouth 3 times a day as needed for Sleep. f 90 Tab 0   • aspirin buffered (ASCRIPTIN) 325 MG TABS Take 1 Tab by mouth every day.     • Dulaglutide (TRULICITY) 1.5 MG/0.5ML Solution Pen-injector Trulicity 1.5 mg/0.5 mL subcutaneous pen injector   INJECT ONE SYRINGE SUBCUTANEOUSLY ONCE A WEEK     • irbesartan-hydrochlorothiazide (AVALIDE) 300-12.5 MG per tablet        No facility-administered encounter medications on file as of 3/8/2019.      Review of Systems   Constitutional: Negative for chills and fever.   HENT: Negative for sore throat.    Eyes: Negative for blurred vision.   Respiratory: Negative for cough and shortness of breath.    Cardiovascular: Negative for chest pain, palpitations, claudication, leg swelling and PND.   Gastrointestinal: Negative for abdominal pain and nausea.   Musculoskeletal: Positive for joint pain. Negative for falls.   Skin: Negative for rash.   Neurological: Negative for dizziness, focal weakness and weakness.   Endo/Heme/Allergies: Does not bruise/bleed  "easily.        Objective:   /68 (BP Location: Left arm, Patient Position: Sitting, BP Cuff Size: Adult)   Pulse 64   Ht 1.651 m (5' 5\")   Wt 76.2 kg (168 lb)   SpO2 97%   BMI 27.96 kg/m²      Physical Exam   Constitutional: No distress.   HENT:   Mouth/Throat: Oropharynx is clear and moist. No oropharyngeal exudate.   Eyes: No scleral icterus.   Neck: No JVD present.   Cardiovascular: Normal rate and normal heart sounds.  Exam reveals no gallop and no friction rub.    No murmur heard.  Pulmonary/Chest: No respiratory distress. She has no wheezes. She has no rales.   Abdominal: Soft. Bowel sounds are normal.   Musculoskeletal: She exhibits no edema.   Neurological: She is alert.   Skin: No rash noted. She is not diaphoretic.   Psychiatric: She has a normal mood and affect.     Labs reviewed from primary care good lipid control on high-dose statin    A1c was 7.9    Pacer check shows is functioning appropriately with 11 episodes of A. fib going back to last June      Assessment:     1. Paroxysmal atrial fibrillation (HCC)     2. Cardiac pacemaker in situ         Medical Decision Making:  Today's Assessment / Status / Plan:     It was my pleasure to meet with Ms. Jain.    Blood pressure is well controlled.      She is on appropriate statin.    We discussed that you should talk with primary care (or endocrinology) about oral medication: empagliflozin (JARDIANCE®  Preferred), dapagliflozin (FARXIGA®), or Canagliflozin (Invokana® [CONCERN FOR FOOT AMPUTATION]); there are cardiovascular benefits but there are also risks.  You may also want to consider liraglutide (Victoza®) which is an injection with cardiovascular benefits but also risks.    We monitor for more afib on pacer checks for now I think we can hold off anticoagulation but she is nearing the appropriate age to start certain if she had episodes over 5 hours and would recommend chronic a coagulation    It is my pleasure to participate in the care of " Ms. Gonzalezes.  Please do not hesitate to contact me with questions or concerns.    Jairo Castillo MD PhD [facility]  Cardiologist [facility]    Please note that this dictation was created using voice recognition software. I have worked with consultants from the vendor as well as technical experts from [facility] to optimize the interface. I have made every reasonable attempt to correct obvious errors, but I expect that there are errors of grammar and possibly content I did not discover before finalizing the note.         Robb Veliz M.D.  [address] [address]  VIA Facsimile: [phone]

## 2019-03-08 NOTE — PROGRESS NOTES
Chief Complaint   Patient presents with   • Atrial Fibrillation       Subjective:   Brooklynn Jain is a 64 y.o. female who presents today for follow-up of her history of paroxysmal A. fib ablation with a pacemaker which was actually placed for sick sinus syndrome.  She also has diabetes hypertension    She is been doing well she has a lot of stress with the significant snowfall this winter at [facility] with some orthopedic pains    Pacer check still shows paroxysms of atrial fibrillation lasting up to 2 hours which is a little bit longer than previously 45 minutes most of short-lived seem to occur throughout the day    Her arb was switched because of potential recall    Past Medical History:   Diagnosis Date   • Atrial fibrillation (HCC)    • Cardiac pacemaker in situ 5/30/2012 6/7/2006: Dual chamber Medtronic impulse DR model, #E2DR01, serial #[id number] implanted by Dr. Jaydon Wyatt.    • Conduction disorder, unspecified CARDIAC CONDUCTION SYSTEM DISEASE, STATUS POST DUAL CHAMBER PACEMAKER.   • Other and unspecified hyperlipidemia    • Other chronic nonalcoholic liver disease    • Signs and symptoms involving emotional state    • Type II or unspecified type diabetes mellitus without mention of complication, not stated as uncontrolled    • Unspecified essential hypertension    • Unspecified sleep apnea      Past Surgical History:   Procedure Laterality Date   • APPENDECTOMY     • CHOLECYSTECTOMY     • TONSILLECTOMY AND ADENOIDECTOMY       Family History   Problem Relation Age of Onset   • Hypertension Father      Social History     Social History   • Marital status:      Spouse name: N/A   • Number of children: N/A   • Years of education: N/A     Occupational History   • Not on file.     Social History Main Topics   • Smoking status: Never Smoker   • Smokeless tobacco: Never Used   • Alcohol use No   • Drug use: No   • Sexual activity: Yes     Other Topics Concern   • Not on file     Social History Narrative  "  • No narrative on file     No Known Allergies  Outpatient Encounter Prescriptions as of 3/8/2019   Medication Sig Dispense Refill   • losartan-hydrochlorothiazide (HYZAAR) 100-12.5 MG per tablet Take 1 Tab by mouth every day.     • simvastatin (ZOCOR) 40 MG Tab Take 40 mg by mouth every evening.     • Multiple Vitamins-Minerals (MULTI FOR HER PO) Take  by mouth.     • B COMPLEX-C PO Take  by mouth.     • labetalol (NORMODYNE) 100 MG TABS TAKE ONE TABLET ORALLY ONCE DAILY (Patient taking differently: TAKE ONE TABLET ORALLY 2x daily) 90 Tab 2   • metformin (GLUCOPHAGE) 500 MG TABS Take 500 mg by mouth 2 times a day.     • alprazolam (XANAX) 0.25 MG TABS Take 1 Tab by mouth 3 times a day as needed for Sleep. f 90 Tab 0   • aspirin buffered (ASCRIPTIN) 325 MG TABS Take 1 Tab by mouth every day.     • Dulaglutide (TRULICITY) 1.5 MG/0.5ML Solution Pen-injector Trulicity 1.5 mg/0.5 mL subcutaneous pen injector   INJECT ONE SYRINGE SUBCUTANEOUSLY ONCE A WEEK     • irbesartan-hydrochlorothiazide (AVALIDE) 300-12.5 MG per tablet        No facility-administered encounter medications on file as of 3/8/2019.      Review of Systems   Constitutional: Negative for chills and fever.   HENT: Negative for sore throat.    Eyes: Negative for blurred vision.   Respiratory: Negative for cough and shortness of breath.    Cardiovascular: Negative for chest pain, palpitations, claudication, leg swelling and PND.   Gastrointestinal: Negative for abdominal pain and nausea.   Musculoskeletal: Positive for joint pain. Negative for falls.   Skin: Negative for rash.   Neurological: Negative for dizziness, focal weakness and weakness.   Endo/Heme/Allergies: Does not bruise/bleed easily.        Objective:   /68 (BP Location: Left arm, Patient Position: Sitting, BP Cuff Size: Adult)   Pulse 64   Ht 1.651 m (5' 5\")   Wt 76.2 kg (168 lb)   SpO2 97%   BMI 27.96 kg/m²     Physical Exam   Constitutional: No distress.   HENT:   Mouth/Throat: " Oropharynx is clear and moist. No oropharyngeal exudate.   Eyes: No scleral icterus.   Neck: No JVD present.   Cardiovascular: Normal rate and normal heart sounds.  Exam reveals no gallop and no friction rub.    No murmur heard.  Pulmonary/Chest: No respiratory distress. She has no wheezes. She has no rales.   Abdominal: Soft. Bowel sounds are normal.   Musculoskeletal: She exhibits no edema.   Neurological: She is alert.   Skin: No rash noted. She is not diaphoretic.   Psychiatric: She has a normal mood and affect.     Labs reviewed from primary care good lipid control on high-dose statin    A1c was 7.9    Pacer check shows is functioning appropriately with 11 episodes of A. fib going back to last June      Assessment:     1. Paroxysmal atrial fibrillation (HCC)     2. Cardiac pacemaker in situ         Medical Decision Making:  Today's Assessment / Status / Plan:     It was my pleasure to meet with Ms. Jain.    Blood pressure is well controlled.      She is on appropriate statin.    We discussed that you should talk with primary care (or endocrinology) about oral medication: empagliflozin (JARDIANCE®  Preferred), dapagliflozin (FARXIGA®), or Canagliflozin (Invokana® [CONCERN FOR FOOT AMPUTATION]); there are cardiovascular benefits but there are also risks.  You may also want to consider liraglutide (Victoza®) which is an injection with cardiovascular benefits but also risks.    We monitor for more afib on pacer checks for now I think we can hold off anticoagulation but she is nearing the appropriate age to start certain if she had episodes over 5 hours and would recommend chronic a coagulation    It is my pleasure to participate in the care of Ms. Jain.  Please do not hesitate to contact me with questions or concerns.    Jairo Castillo MD PhD FACC  Cardiologist [facility]    Please note that this dictation was created using voice recognition software. I have worked with  consultants from the vendor as well as technical experts from [facility] to optimize the interface. I have made every reasonable attempt to correct obvious errors, but I expect that there are errors of grammar and possibly content I did not discover before finalizing the note.

## 2020-03-04 ENCOUNTER — OFFICE VISIT (OUTPATIENT)
Dept: CARDIOLOGY | Facility: MEDICAL CENTER | Age: 65
End: 2020-03-04
Payer: COMMERCIAL

## 2020-03-04 ENCOUNTER — NON-PROVIDER VISIT (OUTPATIENT)
Dept: CARDIOLOGY | Facility: MEDICAL CENTER | Age: 65
End: 2020-03-04
Payer: COMMERCIAL

## 2020-03-04 VITALS
SYSTOLIC BLOOD PRESSURE: 128 MMHG | BODY MASS INDEX: 27.82 KG/M2 | HEART RATE: 64 BPM | HEIGHT: 65 IN | DIASTOLIC BLOOD PRESSURE: 60 MMHG | RESPIRATION RATE: 16 BRPM | WEIGHT: 167 LBS | OXYGEN SATURATION: 97 %

## 2020-03-04 DIAGNOSIS — Z95.0 CARDIAC PACEMAKER IN SITU: ICD-10-CM

## 2020-03-04 DIAGNOSIS — I48.0 PAROXYSMAL ATRIAL FIBRILLATION (HCC): ICD-10-CM

## 2020-03-04 DIAGNOSIS — I49.5 SICK SINUS SYNDROME (HCC): ICD-10-CM

## 2020-03-04 PROCEDURE — 99214 OFFICE O/P EST MOD 30 MIN: CPT | Performed by: INTERNAL MEDICINE

## 2020-03-04 PROCEDURE — 93280 PM DEVICE PROGR EVAL DUAL: CPT | Performed by: INTERNAL MEDICINE

## 2020-03-05 ASSESSMENT — ENCOUNTER SYMPTOMS
PND: 0
NAUSEA: 0
BRUISES/BLEEDS EASILY: 0
SHORTNESS OF BREATH: 0
PALPITATIONS: 0
WEAKNESS: 0
ABDOMINAL PAIN: 0
FALLS: 0
CLAUDICATION: 0
DIZZINESS: 0
SORE THROAT: 0
FEVER: 0
BLURRED VISION: 0
FOCAL WEAKNESS: 0
CHILLS: 0
COUGH: 0

## 2020-03-05 NOTE — PROGRESS NOTES
Chief Complaint   Patient presents with   • Atrial Fibrillation       Subjective:   Brooklynn Jain is a 64 y.o. female who presents today for follow-up of her history of hypertension with a pacemaker sick sinus syndrome paroxysmal atrial fibrillation on pacer checks    She is been doing okay [place] [employer] [place]    She is working with her primary care on her diabetes treatment been doing okay    Past Medical History:   Diagnosis Date   • Atrial fibrillation (HCC)    • Cardiac pacemaker in situ 5/30/2012 6/7/2006: Dual chamber Medtronic impulse DR model, #E2DR01, serial #[id number] implanted by Dr. Jaydon Wyatt.    • Conduction disorder, unspecified CARDIAC CONDUCTION SYSTEM DISEASE, STATUS POST DUAL CHAMBER PACEMAKER.   • Other and unspecified hyperlipidemia    • Other chronic nonalcoholic liver disease    • Signs and symptoms involving emotional state    • Type II or unspecified type diabetes mellitus without mention of complication, not stated as uncontrolled    • Unspecified essential hypertension    • Unspecified sleep apnea      Past Surgical History:   Procedure Laterality Date   • APPENDECTOMY     • CHOLECYSTECTOMY     • TONSILLECTOMY AND ADENOIDECTOMY       Family History   Problem Relation Age of Onset   • Hypertension Father      Social History     Socioeconomic History   • Marital status:      Spouse name: Not on file   • Number of children: Not on file   • Years of education: Not on file   • Highest education level: Not on file   Occupational History   • Not on file   Social Needs   • Financial resource strain: Not on file   • Food insecurity     Worry: Not on file     Inability: Not on file   • Transportation needs     Medical: Not on file     Non-medical: Not on file   Tobacco Use   • Smoking status: Never Smoker   • Smokeless tobacco: Never Used   Substance and Sexual Activity   • Alcohol use: No   • Drug use: No   • Sexual activity: Yes   Lifestyle   • Physical  activity     Days per week: Not on file     Minutes per session: Not on file   • Stress: Not on file   Relationships   • Social connections     Talks on phone: Not on file     Gets together: Not on file     Attends Pentecostalism service: Not on file     Active member of club or organization: Not on file     Attends meetings of clubs or organizations: Not on file     Relationship status: Not on file   • Intimate partner violence     Fear of current or ex partner: Not on file     Emotionally abused: Not on file     Physically abused: Not on file     Forced sexual activity: Not on file   Other Topics Concern   • Not on file   Social History Narrative   • Not on file     Allergies   Allergen Reactions   • Liraglutide    • Triptans  [Sumatriptan]      Other reaction(s): Irregular heart rate     Outpatient Encounter Medications as of 3/4/2020   Medication Sig Dispense Refill   • Dulaglutide (TRULICITY) 1.5 MG/0.5ML Solution Pen-injector Trulicity 1.5 mg/0.5 mL subcutaneous pen injector   INJECT ONE SYRINGE SUBCUTANEOUSLY ONCE A WEEK     • irbesartan-hydrochlorothiazide (AVALIDE) 300-12.5 MG per tablet      • simvastatin (ZOCOR) 40 MG Tab Take 40 mg by mouth every evening.     • Multiple Vitamins-Minerals (MULTI FOR HER PO) Take  by mouth.     • B COMPLEX-C PO Take  by mouth.     • labetalol (NORMODYNE) 100 MG TABS TAKE ONE TABLET ORALLY ONCE DAILY (Patient taking differently: TAKE ONE TABLET ORALLY 2x daily) 90 Tab 2   • metformin (GLUCOPHAGE) 500 MG TABS Take 500 mg by mouth 2 times a day.     • alprazolam (XANAX) 0.25 MG TABS Take 1 Tab by mouth 3 times a day as needed for Sleep. f 90 Tab 0   • aspirin buffered (ASCRIPTIN) 325 MG TABS Take 1 Tab by mouth every day.       No facility-administered encounter medications on file as of 3/4/2020.      Review of Systems   Constitutional: Negative for chills and fever.   HENT: Negative for sore throat.    Eyes: Negative for blurred vision.   Respiratory: Negative for cough and  "shortness of breath.    Cardiovascular: Negative for chest pain, palpitations, claudication, leg swelling and PND.   Gastrointestinal: Negative for abdominal pain and nausea.   Musculoskeletal: Negative for falls and joint pain.   Skin: Negative for rash.   Neurological: Negative for dizziness, focal weakness and weakness.   Endo/Heme/Allergies: Does not bruise/bleed easily.        Objective:   /60 (BP Location: Left arm, Patient Position: Sitting, BP Cuff Size: Adult)   Pulse 64   Resp 16   Ht 1.651 m (5' 5\")   Wt 75.8 kg (167 lb)   SpO2 97%   BMI 27.79 kg/m²     Physical Exam   Constitutional: No distress.   HENT:   Mouth/Throat: Oropharynx is clear and moist. No oropharyngeal exudate.   Eyes: No scleral icterus.   Neck: No JVD present.   Cardiovascular: Normal rate and normal heart sounds. Exam reveals no gallop and no friction rub.   No murmur heard.  Pulmonary/Chest: No respiratory distress. She has no wheezes. She has no rales.   Abdominal: Soft. Bowel sounds are normal.   Musculoskeletal:         General: No edema.   Neurological: She is alert.   Skin: No rash noted. She is not diaphoretic.   Psychiatric: She has a normal mood and affect.     We reviewed in person the most recent labs    I personally reviewed in person with the patient her most recent pacemaker check it is functioning appropriately.    Assessment:     1. Paroxysmal atrial fibrillation (HCC)     2. Sick sinus syndrome (HCC)     3. Cardiac pacemaker in situ         Medical Decision Making:  Today's Assessment / Status / Plan:     It was my pleasure to meet with Ms. Jain.    Blood pressure is well controlled.      She is on appropriate statin.    We discussed that you should talk with primary care (or endocrinology) about oral medication: empagliflozin (JARDIANCE®  Preferred), dapagliflozin (FARXIGA®), or Canagliflozin (Invokana® [CONCERN FOR FOOT AMPUTATION]); there are cardiovascular benefits but there are also risks.  You may " also want to consider liraglutide (Victoza®) which is an injection with cardiovascular benefits but also risks.    I will see Ms. Jain back in 1 year time, with pacer check in 6 months in the interim, and I encouraged her to follow up with us over the phone or electronically using my MyChart as issues arise.    It is my pleasure to participate in the care of Ms. Jain.  Please do not hesitate to contact me with questions or concerns.    Jairo Castillo MD PhD [facility]  Cardiologist [facility]    Please note that this dictation was created using voice recognition software. I have worked with consultants from the vendor as well as technical experts from [facility] to optimize the interface. I have made every reasonable attempt to correct obvious errors, but I expect that there are errors of grammar and possibly content I did not discover before finalizing the note.

## 2021-05-03 ENCOUNTER — OFFICE VISIT (OUTPATIENT)
Dept: CARDIOLOGY | Facility: MEDICAL CENTER | Age: 66
End: 2021-05-03
Payer: COMMERCIAL

## 2021-05-03 ENCOUNTER — NON-PROVIDER VISIT (OUTPATIENT)
Dept: CARDIOLOGY | Facility: MEDICAL CENTER | Age: 66
End: 2021-05-03
Payer: COMMERCIAL

## 2021-05-03 VITALS
BODY MASS INDEX: 26.49 KG/M2 | HEART RATE: 86 BPM | WEIGHT: 159 LBS | OXYGEN SATURATION: 95 % | DIASTOLIC BLOOD PRESSURE: 76 MMHG | SYSTOLIC BLOOD PRESSURE: 142 MMHG | HEIGHT: 65 IN

## 2021-05-03 DIAGNOSIS — I48.0 PAROXYSMAL ATRIAL FIBRILLATION (HCC): ICD-10-CM

## 2021-05-03 DIAGNOSIS — Z95.0 CARDIAC PACEMAKER IN SITU: ICD-10-CM

## 2021-05-03 DIAGNOSIS — I49.5 SICK SINUS SYNDROME (HCC): ICD-10-CM

## 2021-05-03 PROCEDURE — 99214 OFFICE O/P EST MOD 30 MIN: CPT | Performed by: INTERNAL MEDICINE

## 2021-05-03 PROCEDURE — 93280 PM DEVICE PROGR EVAL DUAL: CPT | Performed by: INTERNAL MEDICINE

## 2021-05-03 ASSESSMENT — ENCOUNTER SYMPTOMS
FEVER: 0
BRUISES/BLEEDS EASILY: 0
SORE THROAT: 0
ABDOMINAL PAIN: 0
CHILLS: 0
DIZZINESS: 0
PND: 0
PALPITATIONS: 0
WEAKNESS: 0
SHORTNESS OF BREATH: 0
BLURRED VISION: 0
FOCAL WEAKNESS: 0
NAUSEA: 0
COUGH: 0
FALLS: 0
CLAUDICATION: 0

## 2021-05-03 NOTE — PROGRESS NOTES
Chief Complaint   Patient presents with   • Atrial Fibrillation     F/V Dx: PAROXYSMAL    • HTN (Controlled)       Subjective:   Brooklynn Mensah is a 66 y.o. female who presents today for follow-up of history of hypertension and pacemaker sick sinus syndrome he seems short paroxysmal atrial fibrillation on pacer checks    She has been doing well over the past year tolerating her medications well    Pacer check today shows is functioning appropriately    Past Medical History:   Diagnosis Date   • Atrial fibrillation (HCC)    • Cardiac pacemaker in situ 5/30/2012 6/7/2006: Dual chamber Medtronic impulse DR model, #E2DR01, serial #[id number] implanted by Dr. Jaydon Wyatt.    • Conduction disorder, unspecified CARDIAC CONDUCTION SYSTEM DISEASE, STATUS POST DUAL CHAMBER PACEMAKER.   • Other and unspecified hyperlipidemia    • Other chronic nonalcoholic liver disease    • Signs and symptoms involving emotional state    • Type II or unspecified type diabetes mellitus without mention of complication, not stated as uncontrolled    • Unspecified essential hypertension    • Unspecified sleep apnea      Past Surgical History:   Procedure Laterality Date   • APPENDECTOMY     • CHOLECYSTECTOMY     • TONSILLECTOMY AND ADENOIDECTOMY       Family History   Problem Relation Age of Onset   • Hypertension Father      Social History     Socioeconomic History   • Marital status:      Spouse name: Not on file   • Number of children: Not on file   • Years of education: Not on file   • Highest education level: Not on file   Occupational History   • Not on file   Tobacco Use   • Smoking status: Never Smoker   • Smokeless tobacco: Never Used   Substance and Sexual Activity   • Alcohol use: No   • Drug use: No   • Sexual activity: Yes   Other Topics Concern   • Not on file   Social History Narrative   • Not on file     Social Determinants of Health     Financial Resource Strain:    • Difficulty of Paying Living Expenses:     Food Insecurity:    • Worried About Running Out of Food in the Last Year:    • Ran Out of Food in the Last Year:    Transportation Needs:    • Lack of Transportation (Medical):    • Lack of Transportation (Non-Medical):    Physical Activity:    • Days of Exercise per Week:    • Minutes of Exercise per Session:    Stress:    • Feeling of Stress :    Social Connections:    • Frequency of Communication with Friends and Family:    • Frequency of Social Gatherings with Friends and Family:    • Attends S[employer] Services:    • Active Member of Clubs or Organizations:    • Attends Club or Organization Meetings:    • Marital Status:    Intimate Partner Violence:    • Fear of Current or Ex-Partner:    • Emotionally Abused:    • Physically Abused:    • Sexually Abused:      Allergies   Allergen Reactions   • Liraglutide    • Triptans  [Sumatriptan]      Other reaction(s): Irregular heart rate     Outpatient Encounter Medications as of 5/3/2021   Medication Sig Dispense Refill   • Dulaglutide (TRULICITY) 1.5 MG/0.5ML Solution Pen-injector Trulicity 1.5 mg/0.5 mL subcutaneous pen injector   INJECT ONE SYRINGE SUBCUTANEOUSLY ONCE A WEEK     • irbesartan-hydrochlorothiazide (AVALIDE) 300-12.5 MG per tablet Take 1 tablet by mouth every day.     • simvastatin (ZOCOR) 40 MG Tab Take 40 mg by mouth every evening.     • Multiple Vitamins-Minerals (MULTI FOR HER PO) Take  by mouth.     • labetalol (NORMODYNE) 100 MG TABS TAKE ONE TABLET ORALLY ONCE DAILY (Patient taking differently: TAKE ONE TABLET ORALLY 2x daily) 90 Tab 2   • metformin (GLUCOPHAGE) 500 MG TABS Take 500 mg by mouth 2 times a day.     • alprazolam (XANAX) 0.25 MG TABS Take 1 Tab by mouth 3 times a day as needed for Sleep. f 90 Tab 0   • aspirin buffered (ASCRIPTIN) 325 MG TABS Take 1 tablet by mouth 2 times a day.     • B COMPLEX-C PO Take  by mouth.       No facility-administered encounter medications on file as of 5/3/2021.     Review of Systems   Constitutional:  "Negative for chills and fever.   HENT: Negative for sore throat.    Eyes: Negative for blurred vision.   Respiratory: Negative for cough and shortness of breath.    Cardiovascular: Negative for chest pain, palpitations, claudication, leg swelling and PND.   Gastrointestinal: Negative for abdominal pain and nausea.   Musculoskeletal: Negative for falls and joint pain.   Skin: Negative for rash.   Neurological: Negative for dizziness, focal weakness and weakness.   Endo/Heme/Allergies: Does not bruise/bleed easily.        Objective:   /76 (BP Location: Left arm, Patient Position: Sitting, BP Cuff Size: Adult)   Pulse 86   Ht 1.651 m (5' 5\")   Wt 72.1 kg (159 lb)   SpO2 95%   BMI 26.46 kg/m²     Physical Exam   Constitutional: No distress.   HENT:   Patient wearing a mask due to COVID precautions   Eyes: No scleral icterus.   Neck: No JVD present.   Cardiovascular: Normal rate and normal heart sounds. Exam reveals no gallop and no friction rub.   No murmur heard.  Pulmonary/Chest: No respiratory distress. She has no wheezes. She has no rales.   Abdominal: Soft. Bowel sounds are normal.   Musculoskeletal:         General: No edema.   Neurological: She is alert.   Skin: No rash noted. She is not diaphoretic.   Psychiatric: She has a normal mood and affect.     We reviewed in person the most recent labs    From [facility] 3/15/21  tsh 2.1  Chem normal  HDL     LDL 75  a1C 6.3    ekg 3/15/2021    Possible atrial pacing      Assessment:     1. Paroxysmal atrial fibrillation (HCC)     2. Sick sinus syndrome (HCC)     3. Cardiac pacemaker in situ         Medical Decision Making:  Today's Assessment / Status / Plan:     It was my pleasure to meet with Ms. Mensah.    Blood pressure is well controlled.  We specifically assessed the labs on hypertension treatment    She is on appropriate statin.    I personally reviewed in person with the patient her most recent pacemaker check it is functioning " appropriately.    Pacer checks continue to show very brief PAF not long enough to require anticoagulation    I will see Ms. Mensah back in 1 year time, with pacer check in 6 months in the interim, and I encouraged her to follow up with us over the phone or electronically using my MyChart as issues arise.    It is my pleasure to participate in the care of Ms. Mensah.  Please do not hesitate to contact me with questions or concerns.    Jairo Castillo MD PhD [facility]  Cardiologist [facility]    Please note that this dictation was created using voice recognition software. There may be errors I did not discover before finalizing the note.

## 2022-10-17 ENCOUNTER — TELEPHONE (OUTPATIENT)
Dept: CARDIOLOGY | Facility: MEDICAL CENTER | Age: 67
End: 2022-10-17

## 2022-10-17 NOTE — TELEPHONE ENCOUNTER
Received walk in form from 10/6/22 and reviewed attachment.  Noted [facility].  Faxed to [facility] medical records, [phone], completed status received.    forms sent to scanning via [employer] for reference, completed status.